# Patient Record
(demographics unavailable — no encounter records)

---

## 2024-10-15 NOTE — PROCEDURE
[Cervical Pap Smear] : cervical Pap smear [Liquid Base] : liquid base [Tolerated Well] : the patient tolerated the procedure well [No Complications] : there were no complications [Pelvic Pain] : pelvic pain [Transvaginal Ultrasound] : transvaginal ultrasound [FreeTextEntry4] : Left side pelvic kidney Normal uterus and ovaries

## 2024-10-15 NOTE — PHYSICAL EXAM
[Appropriately responsive] : appropriately responsive [Alert] : alert [No Acute Distress] : no acute distress [No Lymphadenopathy] : no lymphadenopathy [Regular Rate Rhythm] : regular rate rhythm [No Murmurs] : no murmurs [Clear to Auscultation B/L] : clear to auscultation bilaterally [Soft] : soft [Non-distended] : non-distended [No HSM] : No HSM [No Lesions] : no lesions [No Mass] : no mass [Oriented x3] : oriented x3 [Examination Of The Breasts] : a normal appearance [No Discharge] : no discharge [No Masses] : no breast masses were palpable [Labia Majora] : normal [Labia Minora] : normal [Normal] : normal [Enlarged ___ wks] : enlarged [unfilled] ~Uweeks [Uterine Adnexae] : normal  [Adnexa Tenderness On The Left] : tender [FreeTextEntry7] : tender llq  > mid abdomen

## 2024-10-15 NOTE — HISTORY OF PRESENT ILLNESS
[Patient reported mammogram was normal] : Patient reported mammogram was normal [Patient reported PAP Smear was normal] : Patient reported PAP Smear was normal [Patient reported colonoscopy was normal] : Patient reported colonoscopy was normal [Y] : Positive pregnancy history [unknown] : Patient is unsure of the date of her LMP [No] : Patient does not have concerns regarding sex [TextBox_4] : [facility] No history of fibroids, cysts, or STDs LMP 5/23 [Mammogramdate] : 10/2023 [PapSmeardate] : 10/2023 [ColonoscopyDate] : 2021 [PGHxTotal] : 2 [facility] : 2 [FreeTextEntry1] : c/s  2008 2011

## 2024-10-15 NOTE — DISCUSSION/SUMMARY
[FreeTextEntry1] : 48 yo p2  annual , llq pain (most likely due to kidney palpation) , vag dryness, low energy pap hpv mammo Pelvic sono- left pelvic kidney dhea 10 mg daily for 2 weeks  Testosterone cream off label use d/w pt

## 2024-10-15 NOTE — HISTORY OF PRESENT ILLNESS
[Patient reported mammogram was normal] : Patient reported mammogram was normal [Patient reported PAP Smear was normal] : Patient reported PAP Smear was normal [Patient reported colonoscopy was normal] : Patient reported colonoscopy was normal [Y] : Positive pregnancy history [unknown] : Patient is unsure of the date of her LMP [No] : Patient does not have concerns regarding sex [TextBox_4] : GY[facility]X No history of fibroids, cysts, or STDs LMP 5/23 [Mammogramdate] : 10/2023 [PapSmeardate] : 10/2023 [ColonoscopyDate] : 2021 [PGHxTotal] : 2 [facility] [FreeTextEntry1] : c/s  2008 2011

## 2025-02-21 NOTE — PHYSICAL EXAM
[Well Developed] : well developed [Well Nourished] : well nourished [No Acute Distress] : no acute distress [Normal Venous Pressure] : normal venous pressure [Normal S1, S2] : normal S1, S2 [No Murmur] : no murmur [No Rub] : no rub [No Gallop] : no gallop [Clear Lung Fields] : clear lung fields [No Edema] : no edema [Moves all extremities] : moves all extremities [No Focal Deficits] : no focal deficits [Alert and Oriented] : alert and oriented

## 2025-02-23 NOTE — REASON FOR VISIT
[CV Risk Factors and Non-Cardiac Disease] : CV risk factors and non-cardiac disease [Hyperlipidemia] : hyperlipidemia [Parent] : parent [Symptom and Test Evaluation] : symptom and test evaluation [Arrhythmia/ECG Abnorrmalities] : arrhythmia/ECG abnormalities

## 2025-02-23 NOTE — HISTORY OF PRESENT ILLNESS
[FreeTextEntry1] : Pt is 48 year old Female with PMH of RA, Migraines, IBS, hyperlipidemia since age 15 Developed pancreatitis at age 39 due severe Hypertriglyceridemia  She stopped taking Lipitor due to myalgias, cont to be on Tricor  Has Family hx of Marfan syndrome,  AFib, CAD  + occasional palpitations after meals, has SOB, has increased fatigue  ET decreased due to RA Pt never had any cardiac testing before  11/14/24 HgbA1C 5.4 TSH 2.12  Chol 183  Trig 151 on atorv and tricor

## 2025-02-23 NOTE — ASSESSMENT
[FreeTextEntry1] : Assessment: #HLD Hypertriglyceridemia  #RA #Family hx of CAD   Intermediate Risk for CAD   Plan: CCTA to R/o CAD  Repeat Lipids will adjust cholesterol management after labs  TTE  Cont Tricor F/u with Rheumatologist for RA management no improvement in pain since stopping statins 2 months ago   F/u after testing  Low Chol diet  Aggressive risks modifications Activities as tolerated  F/u in 3  months or sooner PRN  att note pt seen and examined agree with above

## 2025-02-23 NOTE — REVIEW OF SYSTEMS
[Dyspnea on exertion] : dyspnea during exertion [Palpitations] : palpitations [Negative] : Heme/Lymph [Feeling Fatigued] : feeling fatigued [Chest Discomfort] : no chest discomfort [Lower Ext Edema] : no extremity edema

## 2025-06-06 NOTE — HISTORY OF PRESENT ILLNESS
[FreeTextEntry1] : Pt is 48 year old Female with PMH of RA, Migraines, IBS, hyperlipidemia since age 15 Developed pancreatitis at age 39 due severe Hypertriglyceridemia  She stopped taking Lipitor due to myalgias, cont to be on Tricor  Has Family hx of Marfan syndrome,  AFib, CAD  + occasional palpitations after meals, has SOB, has increased fatigue  Had one episode of  for 20m after having lunch at the diner  ET decreased due to RA 4/28/25 HgbA1C 5.4 TSH 1.2   Chol 212  Trig 174  tricor HgbA1c 5.4  CCTA 3/21/25 Non- obs CAD Small PFO Calcium score 1  TTE 3/18/28 TTE 59%

## 2025-06-06 NOTE — REASON FOR VISIT
[Symptom and Test Evaluation] : symptom and test evaluation [CV Risk Factors and Non-Cardiac Disease] : CV risk factors and non-cardiac disease [Arrhythmia/ECG Abnorrmalities] : arrhythmia/ECG abnormalities [Hyperlipidemia] : hyperlipidemia [Parent] : parent

## 2025-06-06 NOTE — REVIEW OF SYSTEMS
[Feeling Fatigued] : feeling fatigued [Dyspnea on exertion] : dyspnea during exertion [Palpitations] : palpitations [Negative] : Heme/Lymph [Chest Discomfort] : no chest discomfort [Lower Ext Edema] : no extremity edema